# Patient Record
Sex: FEMALE | URBAN - METROPOLITAN AREA
[De-identification: names, ages, dates, MRNs, and addresses within clinical notes are randomized per-mention and may not be internally consistent; named-entity substitution may affect disease eponyms.]

---

## 2017-06-12 ENCOUNTER — EMERGENCY (EMERGENCY)
Facility: HOSPITAL | Age: 22
LOS: 1 days | Discharge: HOME | End: 2017-06-12
Attending: EMERGENCY MEDICINE | Admitting: EMERGENCY MEDICINE
Payer: SELF-PAY

## 2017-06-12 VITALS
HEART RATE: 78 BPM | RESPIRATION RATE: 17 BRPM | SYSTOLIC BLOOD PRESSURE: 122 MMHG | OXYGEN SATURATION: 100 % | DIASTOLIC BLOOD PRESSURE: 74 MMHG | TEMPERATURE: 99 F

## 2017-06-12 PROCEDURE — 99285 EMERGENCY DEPT VISIT HI MDM: CPT | Mod: 25

## 2017-06-12 PROCEDURE — 99053 MED SERV 10PM-8AM 24 HR FAC: CPT

## 2017-06-13 VITALS
DIASTOLIC BLOOD PRESSURE: 71 MMHG | SYSTOLIC BLOOD PRESSURE: 104 MMHG | HEART RATE: 81 BPM | OXYGEN SATURATION: 100 % | RESPIRATION RATE: 18 BRPM

## 2017-06-13 DIAGNOSIS — N12 TUBULO-INTERSTITIAL NEPHRITIS, NOT SPECIFIED AS ACUTE OR CHRONIC: ICD-10-CM

## 2017-06-13 LAB
ALBUMIN SERPL ELPH-MCNC: 4.3 G/DL — SIGNIFICANT CHANGE UP (ref 3.3–5)
ALP SERPL-CCNC: 89 U/L — SIGNIFICANT CHANGE UP (ref 40–120)
ALT FLD-CCNC: 11 U/L — SIGNIFICANT CHANGE UP (ref 4–33)
APPEARANCE UR: CLEAR — SIGNIFICANT CHANGE UP
AST SERPL-CCNC: 18 U/L — SIGNIFICANT CHANGE UP (ref 4–32)
BASOPHILS # BLD AUTO: 0.02 K/UL — SIGNIFICANT CHANGE UP (ref 0–0.2)
BASOPHILS NFR BLD AUTO: 0.2 % — SIGNIFICANT CHANGE UP (ref 0–2)
BILIRUB SERPL-MCNC: 0.4 MG/DL — SIGNIFICANT CHANGE UP (ref 0.2–1.2)
BILIRUB UR-MCNC: NEGATIVE — SIGNIFICANT CHANGE UP
BLOOD UR QL VISUAL: HIGH
BUN SERPL-MCNC: 7 MG/DL — SIGNIFICANT CHANGE UP (ref 7–23)
CALCIUM SERPL-MCNC: 9.4 MG/DL — SIGNIFICANT CHANGE UP (ref 8.4–10.5)
CHLORIDE SERPL-SCNC: 105 MMOL/L — SIGNIFICANT CHANGE UP (ref 98–107)
CO2 SERPL-SCNC: 24 MMOL/L — SIGNIFICANT CHANGE UP (ref 22–31)
COD CRY URNS QL: SIGNIFICANT CHANGE UP (ref 0–0)
COLOR SPEC: YELLOW — SIGNIFICANT CHANGE UP
CREAT SERPL-MCNC: 0.62 MG/DL — SIGNIFICANT CHANGE UP (ref 0.5–1.3)
EOSINOPHIL # BLD AUTO: 0.14 K/UL — SIGNIFICANT CHANGE UP (ref 0–0.5)
EOSINOPHIL NFR BLD AUTO: 1.6 % — SIGNIFICANT CHANGE UP (ref 0–6)
GLUCOSE SERPL-MCNC: 87 MG/DL — SIGNIFICANT CHANGE UP (ref 70–99)
GLUCOSE UR-MCNC: NEGATIVE — SIGNIFICANT CHANGE UP
HCT VFR BLD CALC: 36.7 % — SIGNIFICANT CHANGE UP (ref 34.5–45)
HGB BLD-MCNC: 11 G/DL — LOW (ref 11.5–15.5)
IMM GRANULOCYTES NFR BLD AUTO: 0.2 % — SIGNIFICANT CHANGE UP (ref 0–1.5)
KETONES UR-MCNC: SIGNIFICANT CHANGE UP
LEUKOCYTE ESTERASE UR-ACNC: HIGH
LIDOCAIN IGE QN: 21.2 U/L — SIGNIFICANT CHANGE UP (ref 7–60)
LYMPHOCYTES # BLD AUTO: 2.93 K/UL — SIGNIFICANT CHANGE UP (ref 1–3.3)
LYMPHOCYTES # BLD AUTO: 34 % — SIGNIFICANT CHANGE UP (ref 13–44)
MCHC RBC-ENTMCNC: 22.2 PG — LOW (ref 27–34)
MCHC RBC-ENTMCNC: 30 % — LOW (ref 32–36)
MCV RBC AUTO: 74.1 FL — LOW (ref 80–100)
MONOCYTES # BLD AUTO: 0.61 K/UL — SIGNIFICANT CHANGE UP (ref 0–0.9)
MONOCYTES NFR BLD AUTO: 7.1 % — SIGNIFICANT CHANGE UP (ref 2–14)
MUCOUS THREADS # UR AUTO: SIGNIFICANT CHANGE UP
NEUTROPHILS # BLD AUTO: 4.9 K/UL — SIGNIFICANT CHANGE UP (ref 1.8–7.4)
NEUTROPHILS NFR BLD AUTO: 56.9 % — SIGNIFICANT CHANGE UP (ref 43–77)
NITRITE UR-MCNC: NEGATIVE — SIGNIFICANT CHANGE UP
PH UR: 6.5 — SIGNIFICANT CHANGE UP (ref 4.6–8)
PLATELET # BLD AUTO: 269 K/UL — SIGNIFICANT CHANGE UP (ref 150–400)
PMV BLD: 11.3 FL — SIGNIFICANT CHANGE UP (ref 7–13)
POTASSIUM SERPL-MCNC: 4.2 MMOL/L — SIGNIFICANT CHANGE UP (ref 3.5–5.3)
POTASSIUM SERPL-SCNC: 4.2 MMOL/L — SIGNIFICANT CHANGE UP (ref 3.5–5.3)
PROT SERPL-MCNC: 7.6 G/DL — SIGNIFICANT CHANGE UP (ref 6–8.3)
PROT UR-MCNC: 20 — SIGNIFICANT CHANGE UP
RBC # BLD: 4.95 M/UL — SIGNIFICANT CHANGE UP (ref 3.8–5.2)
RBC # FLD: 14.8 % — HIGH (ref 10.3–14.5)
RBC CASTS # UR COMP ASSIST: SIGNIFICANT CHANGE UP (ref 0–?)
SODIUM SERPL-SCNC: 144 MMOL/L — SIGNIFICANT CHANGE UP (ref 135–145)
SP GR SPEC: 1.01 — SIGNIFICANT CHANGE UP (ref 1–1.03)
SQUAMOUS # UR AUTO: SIGNIFICANT CHANGE UP
UROBILINOGEN FLD QL: NORMAL E.U. — SIGNIFICANT CHANGE UP (ref 0.1–0.2)
WBC # BLD: 8.62 K/UL — SIGNIFICANT CHANGE UP (ref 3.8–10.5)
WBC # FLD AUTO: 8.62 K/UL — SIGNIFICANT CHANGE UP (ref 3.8–10.5)
WBC CLUMPS #/AREA URNS HPF: PRESENT — HIGH (ref 0–?)
WBC UR QL: SIGNIFICANT CHANGE UP (ref 0–?)

## 2017-06-13 PROCEDURE — 74177 CT ABD & PELVIS W/CONTRAST: CPT | Mod: 26

## 2017-06-13 RX ORDER — METOCLOPRAMIDE HCL 10 MG
10 TABLET ORAL ONCE
Qty: 0 | Refills: 0 | Status: COMPLETED | OUTPATIENT
Start: 2017-06-13 | End: 2017-06-13

## 2017-06-13 RX ORDER — CEFTRIAXONE 500 MG/1
1 INJECTION, POWDER, FOR SOLUTION INTRAMUSCULAR; INTRAVENOUS ONCE
Qty: 0 | Refills: 0 | Status: COMPLETED | OUTPATIENT
Start: 2017-06-13 | End: 2017-06-13

## 2017-06-13 RX ORDER — CIPROFLOXACIN LACTATE 400MG/40ML
1 VIAL (ML) INTRAVENOUS
Qty: 28 | Refills: 0
Start: 2017-06-13 | End: 2017-06-27

## 2017-06-13 RX ORDER — SODIUM CHLORIDE 9 MG/ML
1000 INJECTION INTRAMUSCULAR; INTRAVENOUS; SUBCUTANEOUS ONCE
Qty: 0 | Refills: 0 | Status: COMPLETED | OUTPATIENT
Start: 2017-06-13 | End: 2017-06-13

## 2017-06-13 RX ORDER — ACETAMINOPHEN 500 MG
1000 TABLET ORAL ONCE
Qty: 0 | Refills: 0 | Status: DISCONTINUED | OUTPATIENT
Start: 2017-06-13 | End: 2017-06-13

## 2017-06-13 RX ADMIN — SODIUM CHLORIDE 1000 MILLILITER(S): 9 INJECTION INTRAMUSCULAR; INTRAVENOUS; SUBCUTANEOUS at 02:07

## 2017-06-13 RX ADMIN — CEFTRIAXONE 100 GRAM(S): 500 INJECTION, POWDER, FOR SOLUTION INTRAMUSCULAR; INTRAVENOUS at 05:26

## 2017-06-13 RX ADMIN — Medication 10 MILLIGRAM(S): at 02:07

## 2017-06-13 NOTE — CONSULT NOTE ADULT - ASSESSMENT
21 y.o female with right lower quadrant pain, nausea and vomiting. Her pain was improved in the ED. Ct did not show  Infectious etiology vs passed stone.

## 2017-06-13 NOTE — ED PROVIDER NOTE - CARE PLAN
Principal Discharge DX:	Pyelonephritis  Instructions for follow-up, activity and diet:	Follow up with your PMD within 48-72 hours or Follow up in the clinic ([phone]).  Take Cipro 500 mg 1 tab 2x/day for 14 days.  Increase fluids. Motrin 600mg every 8 hours with food for pain. Worsening pain, new fever, chills, nausea, vomiting return to ER. Principal Discharge DX:	Pyelonephritis  Instructions for follow-up, activity and diet:	Follow up with your PMD within 48-72 hours or Follow up in the clinic ([phone]). Follow up with urology in 3 days for a repeat ultrasound.  Take Cipro 500 mg 1 tab 2x/day for 14 days.  Increase fluids. Motrin 600mg every 8 hours with food for pain. Worsening pain, new fever, chills, nausea, vomiting return to ER. Principal Discharge DX:	Pyelonephritis  Instructions for follow-up, activity and diet:	Follow up with your PMD within 48-72 hours or Follow up in the clinic ([phone]). Follow up with urology in 3 days for a repeat ultrasound.  Take Cipro 500 mg 1 tab 2x/day for 14 days.  Increase fluids. Motrin 600mg every 8 hours with food for pain. Worsening pain, new fever, chills, nausea, vomiting return to ER. Principal Discharge DX:	Pyelonephritis  Instructions for follow-up, activity and diet:	Follow up with your PMD within 48-72 hours or Follow up in the clinic ([phone]). Follow up with urology in 3 days for a repeat ultrasound.  Take Cipro 500 mg 1 tab 2x/day for 14 days.  Increase fluids. Motrin 600mg every 8 hours with food for pain. Worsening pain, new fever, chills, nausea, vomiting return to ER.

## 2017-06-13 NOTE — ED PROVIDER NOTE - PLAN OF CARE
Follow up with your PMD within 48-72 hours or Follow up in the clinic ([phone]).  Take Cipro 500 mg 1 tab 2x/day for 14 days.  Increase fluids. Motrin 600mg every 8 hours with food for pain. Worsening pain, new fever, chills, nausea, vomiting return to ER. Follow up with your PMD within 48-72 hours or Follow up in the clinic ([phone]). Follow up with urology in 3 days for a repeat ultrasound.  Take Cipro 500 mg 1 tab 2x/day for 14 days.  Increase fluids. Motrin 600mg every 8 hours with food for pain. Worsening pain, new fever, chills, nausea, vomiting return to ER.

## 2017-06-13 NOTE — ED PROVIDER NOTE - OBJECTIVE STATEMENT
20 yo female no significant PMHx presents to the ED c/o right sided abdominal pain and 6-7 episodes of vomiting x few hours. Pt states that around 6 pm she began having the pain and vomiting. Denies cp ,sob, fever, chills, urinary sx , vaginal bleeding, vaginal discharge, diarrhea, constipation, recent travel, change in food, recent illness, social history. Pt is sexually active with one partner. LMP finished yesterday.

## 2017-06-13 NOTE — ED PROVIDER NOTE - PROGRESS NOTE DETAILS
NIDIA Alcantara: urology states pt needs po abx and repeat ultrasound, and NIDIA Alcantara: urology states pt needs po abx and repeat ultrasound, Pt states she is feeling better. NIDIA Alcantara: urology states pt needs po abx and repeat ultrasound, Pt states she is feeling better. PO challenge passed. NIDIA Alcantara: Considered pt for admission for IV abx and IVF, spoke with the hospitalist on call and urology. Urology states pt needs po abx and repeat ultrasound. With patient Vitals signs stable, and given her age and no pmhx, pt can be treated out patient at this time. Hospitalist and Urology do not think pt needs admission to hospital at this time. Pt states she is feeling better. PO challenge passed. VSS. Pt ok for discharge.

## 2017-06-13 NOTE — ED PROVIDER NOTE - ATTENDING CONTRIBUTION TO CARE
21 year old female w right sided abdominal pain for the last several days.  + nausea and moderate pain to RLQ and RUQ .  Pt is sexually active.  PE + marked tenderness in RLQ + guarding no rebound,  CV s1s2  Lungs cta b/l Neuro nonfocal exam  Impression Rule out appendicitis.

## 2017-06-13 NOTE — ED ADULT NURSE NOTE - OBJECTIVE STATEMENT
patient arrives to intake c/o rlq pain accompanied with n/v and a headache, denies any chest pain or shortness of breath, denies pertinent past medical history. nad noted. respirations are even and unlabored. spo2 100% on room air. 20g iv access obtained, labs drawn and sent, denies any diarrhea or fevers at home. denies any urinary symptoms. appears comfortable, medicated per orders. primary intake rn aware of patient status.

## 2017-06-13 NOTE — CONSULT NOTE ADULT - SUBJECTIVE AND OBJECTIVE BOX
HPI: This is a 21 y.o female who came to the ER complaining of right lower quadrant pain associated with nausea and vomiting. She denies any fever, flank pain, dysuria and hematuria. Her pain has improved in ED without medications.    PAST MEDICAL & SURGICAL HISTORY:  No pertinent past medical history  No significant past surgical history      MEDICATIONS: none        FAMILY HISTORY:  No pertinent family history in first degree relatives      Allergies    No Known Allergies            SOCIAL HISTORY: No smoking    REVIEW OF SYSTEMS: Otherwise negative as stated in HPI    Physical Exam  Vital signs  T(F): 98, Max: 99 ( @ 23:31)  HR: 81  BP: 104/71  SpO2: 100%    Gen:  [x] NAD [] toxic    Pulm:  [x] no resp distress [] no substernal retractions  	  CV:  [x] no JVD  [x] RRR    GI:  [x] Soft [x] ND + right lower quadrant tenderness, no CVA tenderness bilaterally, no guarding, no rebound.                          	  MSK:  Edema []Y [x]N    LABS:       @ 00:33    WBC 8.62  / Hct 36.7  / SCr 0.62       Urinalysis Basic - ( 2017 01:15 )    Color: YELLOW / Appearance: CLEAR / S.014 / pH: 6.5  Gluc: NEGATIVE / Ketone: TRACE  / Bili: NEGATIVE / Urobili: NORMAL E.U.   Blood: TRACE / Protein: 20 / Nitrite: NEGATIVE   Leuk Esterase: LARGE / RBC: 0-2 / WBC 25-50   Sq Epi: OCC / Non Sq Epi: x / Bacteria: x        Urine Cx: p      RADIOLOGY:     IMPRESSION: Mild bilateral hydronephrosis with abnormal bilateral   urothelial enhancement, suspicious for pyelitis. No perinephric fluid   collection. Correlate with clinical examination and urinalysis.

## 2017-06-13 NOTE — ED PROVIDER NOTE - MEDICAL DECISION MAKING DETAILS
20 yo female with vomiting and RLQ pain   R/o appy. DDx ovarian pathology.   Plan: labs, ct, ivf, reglan, reassess.

## 2017-06-13 NOTE — ED PROVIDER NOTE - CHPI ED SYMPTOMS NEG
no diarrhea/no abdominal distension/no hematuria/no dysuria/no fever/no burning urination/no chills/no blood in stool

## 2017-06-14 PROBLEM — Z00.00 ENCOUNTER FOR PREVENTIVE HEALTH EXAMINATION: Status: ACTIVE | Noted: 2017-06-14

## 2017-06-14 LAB — SPECIMEN SOURCE: SIGNIFICANT CHANGE UP

## 2017-06-15 ENCOUNTER — APPOINTMENT (OUTPATIENT)
Dept: ULTRASOUND IMAGING | Facility: CLINIC | Age: 22
End: 2017-06-15

## 2017-06-15 LAB — BACTERIA UR CULT: SIGNIFICANT CHANGE UP

## 2017-06-26 NOTE — ED ADULT NURSE NOTE - TEMPLATE LIST FOR HEAD TO TOE ASSESSMENT
Pt daughter called in regarding the approval for her dads PT. Contacted our auth dept to check status of referral and will call them back.      General

## 2019-12-30 ENCOUNTER — OUTPATIENT (OUTPATIENT)
Dept: EMERGENCY DEPT | Facility: HOSPITAL | Age: 24
LOS: 1 days | Discharge: HOME | End: 2019-12-30

## 2019-12-30 VITALS
OXYGEN SATURATION: 100 % | DIASTOLIC BLOOD PRESSURE: 70 MMHG | RESPIRATION RATE: 16 BRPM | TEMPERATURE: 99 F | HEART RATE: 137 BPM | SYSTOLIC BLOOD PRESSURE: 108 MMHG

## 2019-12-30 LAB
ALBUMIN SERPL ELPH-MCNC: 4 G/DL — SIGNIFICANT CHANGE UP (ref 3.3–5)
ALP SERPL-CCNC: 75 U/L — SIGNIFICANT CHANGE UP (ref 40–120)
ALT FLD-CCNC: 8 U/L — SIGNIFICANT CHANGE UP (ref 4–33)
ANION GAP SERPL CALC-SCNC: 13 MMO/L — SIGNIFICANT CHANGE UP (ref 7–14)
APPEARANCE UR: SIGNIFICANT CHANGE UP
AST SERPL-CCNC: 14 U/L — SIGNIFICANT CHANGE UP (ref 4–32)
BACTERIA # UR AUTO: SIGNIFICANT CHANGE UP
BASE EXCESS BLDV CALC-SCNC: -1.4 MMOL/L — SIGNIFICANT CHANGE UP
BASOPHILS # BLD AUTO: 0.03 K/UL — SIGNIFICANT CHANGE UP (ref 0–0.2)
BASOPHILS NFR BLD AUTO: 0.3 % — SIGNIFICANT CHANGE UP (ref 0–2)
BILIRUB SERPL-MCNC: 0.5 MG/DL — SIGNIFICANT CHANGE UP (ref 0.2–1.2)
BILIRUB UR-MCNC: NEGATIVE — SIGNIFICANT CHANGE UP
BLOOD GAS VENOUS - CREATININE: 0.59 MG/DL — SIGNIFICANT CHANGE UP (ref 0.5–1.3)
BLOOD GAS VENOUS - FIO2: 21 — SIGNIFICANT CHANGE UP
BLOOD UR QL VISUAL: SIGNIFICANT CHANGE UP
BUN SERPL-MCNC: 6 MG/DL — LOW (ref 7–23)
CALCIUM SERPL-MCNC: 9.1 MG/DL — SIGNIFICANT CHANGE UP (ref 8.4–10.5)
CHLORIDE BLDV-SCNC: 106 MMOL/L — SIGNIFICANT CHANGE UP (ref 96–108)
CHLORIDE SERPL-SCNC: 102 MMOL/L — SIGNIFICANT CHANGE UP (ref 98–107)
CO2 SERPL-SCNC: 19 MMOL/L — LOW (ref 22–31)
COLOR SPEC: YELLOW — SIGNIFICANT CHANGE UP
CREAT SERPL-MCNC: 0.54 MG/DL — SIGNIFICANT CHANGE UP (ref 0.5–1.3)
EOSINOPHIL # BLD AUTO: 0.06 K/UL — SIGNIFICANT CHANGE UP (ref 0–0.5)
EOSINOPHIL NFR BLD AUTO: 0.6 % — SIGNIFICANT CHANGE UP (ref 0–6)
GAS PNL BLDV: 134 MMOL/L — LOW (ref 136–146)
GLUCOSE BLDV-MCNC: 113 MG/DL — HIGH (ref 70–99)
GLUCOSE SERPL-MCNC: 113 MG/DL — HIGH (ref 70–99)
GLUCOSE UR-MCNC: NEGATIVE — SIGNIFICANT CHANGE UP
HCG SERPL-ACNC: < 5 MIU/ML — SIGNIFICANT CHANGE UP
HCO3 BLDV-SCNC: 23 MMOL/L — SIGNIFICANT CHANGE UP (ref 20–27)
HCT VFR BLD CALC: 38.5 % — SIGNIFICANT CHANGE UP (ref 34.5–45)
HCT VFR BLDV CALC: 36.5 % — SIGNIFICANT CHANGE UP (ref 34.5–45)
HGB BLD-MCNC: 11.3 G/DL — LOW (ref 11.5–15.5)
HGB BLDV-MCNC: 11.9 G/DL — SIGNIFICANT CHANGE UP (ref 11.5–15.5)
IMM GRANULOCYTES NFR BLD AUTO: 0.2 % — SIGNIFICANT CHANGE UP (ref 0–1.5)
KETONES UR-MCNC: NEGATIVE — SIGNIFICANT CHANGE UP
LACTATE BLDV-MCNC: 1.3 MMOL/L — SIGNIFICANT CHANGE UP (ref 0.5–2)
LEUKOCYTE ESTERASE UR-ACNC: HIGH
LYMPHOCYTES # BLD AUTO: 1.86 K/UL — SIGNIFICANT CHANGE UP (ref 1–3.3)
LYMPHOCYTES # BLD AUTO: 17.7 % — SIGNIFICANT CHANGE UP (ref 13–44)
MCHC RBC-ENTMCNC: 20.9 PG — LOW (ref 27–34)
MCHC RBC-ENTMCNC: 29.4 % — LOW (ref 32–36)
MCV RBC AUTO: 71.2 FL — LOW (ref 80–100)
MONOCYTES # BLD AUTO: 0.9 K/UL — SIGNIFICANT CHANGE UP (ref 0–0.9)
MONOCYTES NFR BLD AUTO: 8.6 % — SIGNIFICANT CHANGE UP (ref 2–14)
NEUTROPHILS # BLD AUTO: 7.63 K/UL — HIGH (ref 1.8–7.4)
NEUTROPHILS NFR BLD AUTO: 72.6 % — SIGNIFICANT CHANGE UP (ref 43–77)
NITRITE UR-MCNC: POSITIVE — SIGNIFICANT CHANGE UP
NRBC # FLD: 0 K/UL — SIGNIFICANT CHANGE UP (ref 0–0)
PCO2 BLDV: 44 MMHG — SIGNIFICANT CHANGE UP (ref 41–51)
PH BLDV: 7.35 PH — SIGNIFICANT CHANGE UP (ref 7.32–7.43)
PH UR: 6.5 — SIGNIFICANT CHANGE UP (ref 5–8)
PLATELET # BLD AUTO: 257 K/UL — SIGNIFICANT CHANGE UP (ref 150–400)
PMV BLD: 11.4 FL — SIGNIFICANT CHANGE UP (ref 7–13)
PO2 BLDV: 41 MMHG — HIGH (ref 35–40)
POTASSIUM BLDV-SCNC: 3 MMOL/L — LOW (ref 3.4–4.5)
POTASSIUM SERPL-MCNC: 3.2 MMOL/L — LOW (ref 3.5–5.3)
POTASSIUM SERPL-SCNC: 3.2 MMOL/L — LOW (ref 3.5–5.3)
PROT SERPL-MCNC: 7.6 G/DL — SIGNIFICANT CHANGE UP (ref 6–8.3)
PROT UR-MCNC: 70 — SIGNIFICANT CHANGE UP
RBC # BLD: 5.41 M/UL — HIGH (ref 3.8–5.2)
RBC # FLD: 15.5 % — HIGH (ref 10.3–14.5)
RBC CASTS # UR COMP ASSIST: SIGNIFICANT CHANGE UP (ref 0–?)
SAO2 % BLDV: 68.8 % — SIGNIFICANT CHANGE UP (ref 60–85)
SODIUM SERPL-SCNC: 134 MMOL/L — LOW (ref 135–145)
SP GR SPEC: 1.01 — SIGNIFICANT CHANGE UP (ref 1–1.04)
UROBILINOGEN FLD QL: NORMAL — SIGNIFICANT CHANGE UP
WBC # BLD: 10.5 K/UL — SIGNIFICANT CHANGE UP (ref 3.8–10.5)
WBC # FLD AUTO: 10.5 K/UL — SIGNIFICANT CHANGE UP (ref 3.8–10.5)
WBC UR QL: >50 — HIGH (ref 0–?)

## 2019-12-30 RX ORDER — ACETAMINOPHEN 500 MG
650 TABLET ORAL ONCE
Refills: 0 | Status: COMPLETED | OUTPATIENT
Start: 2019-12-30 | End: 2019-12-30

## 2019-12-30 RX ORDER — MORPHINE SULFATE 50 MG/1
2 CAPSULE, EXTENDED RELEASE ORAL ONCE
Refills: 0 | Status: DISCONTINUED | OUTPATIENT
Start: 2019-12-30 | End: 2019-12-30

## 2019-12-30 RX ORDER — SODIUM CHLORIDE 9 MG/ML
2000 INJECTION INTRAMUSCULAR; INTRAVENOUS; SUBCUTANEOUS ONCE
Refills: 0 | Status: COMPLETED | OUTPATIENT
Start: 2019-12-30 | End: 2019-12-30

## 2019-12-30 RX ADMIN — SODIUM CHLORIDE 2000 MILLILITER(S): 9 INJECTION INTRAMUSCULAR; INTRAVENOUS; SUBCUTANEOUS at 22:53

## 2019-12-30 RX ADMIN — Medication 650 MILLIGRAM(S): at 22:53

## 2019-12-30 RX ADMIN — MORPHINE SULFATE 2 MILLIGRAM(S): 50 CAPSULE, EXTENDED RELEASE ORAL at 22:53

## 2019-12-30 NOTE — ED PROVIDER NOTE - PHYSICAL EXAMINATION
Vital Signs Last 24 Hrs  T(C): 37.9 (30 Dec 2019 22:44), Max: 37.9 (30 Dec 2019 22:44)  T(F): 100.2 (30 Dec 2019 22:44), Max: 100.2 (30 Dec 2019 22:44)  HR: 105 (30 Dec 2019 22:44) (105 - 137)  BP: 121/80 (30 Dec 2019 22:44) (108/70 - 121/80)  BP(mean): --  RR: 15 (30 Dec 2019 22:44) (15 - 16)  SpO2: 100% (30 Dec 2019 22:44) (100% - 100%)    General: NAD  Head: Normocephalic, Atraumatic  Eyes: PERRLA, EOMI, normal sclera  Throat: Moist mucous membranes  Respiratory: CTAB, normal respiratory effort, no wheezes, crackles, rales  CV: RRR, S1/S2, no murmurs, rubs or gallops  Abdominal: Soft, bowel sounds present, tender to palpation in RLQ, CVA tenderness present, ND  Extremities: No edema, 2+ DP pulses  Neurological: A&Ox4, MAEx4, non-focal  Skin: No rashes

## 2019-12-30 NOTE — ED PROVIDER NOTE - OBJECTIVE STATEMENT
24 year old female with history of kidney stones and urinary 24 year old female presents with complaint of abdominal pain. Last night, the patient began to experience RLQ pain with associated right flank pain. There is associated nausea. The patient has vomited 6-7 times. Emesis NB/NB. Has had a similar episode two years ago. She has experienced increased urinary frequency and cloudy urine. She has had fever, chills, headache and dizziness. She denies chest pain, shortness of breath, constipation or diarrhea.

## 2019-12-30 NOTE — ED PROVIDER NOTE - CLINICAL SUMMARY MEDICAL DECISION MAKING FREE TEXT BOX
24 year old female with RLQ pain with CVA tenderness. CBC, CMP, UA, UCX, CT abdomen and pelvis, hCG ordered, and NS bolus ordered. Will perform pelvic exam.

## 2019-12-30 NOTE — ED PROVIDER NOTE - ATTENDING CONTRIBUTION TO CARE
Attending Statement: I have personally seen and examined this patient. I have fully participated in the care of this patient. I have reviewed all pertinent clinical information, including history physical exam, plan and the Resident's note and agree except as noted  25yo F no sig pmhx pw RLQ pain since last night. Pain located on the RLQ radiate to the back, constant pain, worse with urination. +dysuria No hematuria +subjective temp, chills. +nausea +nb vomit. LMP 12-4-19 denies preg. no hx vag discharge   Vital signs noted. +tachcyardic +febrile MMM. non icteric. soft tender RLQ, right flank. no guarding neg rose sign no pedal edema. no calf tenderness. normal pulses bilateral feet.  plan labs, urine, ct abdomen, IVF, pain med

## 2019-12-30 NOTE — ED ADULT TRIAGE NOTE - CHIEF COMPLAINT QUOTE
Pt with a pmhx of kidney stones and uti presents with c/o right flank, right abd pain and nausea since last night. Pt took Motrin prior to ED arrival.

## 2019-12-30 NOTE — ED ADULT NURSE NOTE - OBJECTIVE STATEMENT
pt a&o x3 c/o right flank pain radiating towards right lower quadrant since last night. endorses nausea, vomiting and subjective fevers associated with chills. hx uti and kidney stones. nad noted. labs drawn and sent, left ac 20g placed

## 2019-12-30 NOTE — ED ADULT NURSE NOTE - ED STAT RN HANDOFF DETAILS
endorsed to rn orlene. pt a&o x3, no c./o pain. iv line patent, no s/s of infiltration. nad noted. safety maintained

## 2019-12-31 ENCOUNTER — TRANSCRIPTION ENCOUNTER (OUTPATIENT)
Age: 24
End: 2019-12-31

## 2019-12-31 DIAGNOSIS — R10.9 UNSPECIFIED ABDOMINAL PAIN: ICD-10-CM

## 2019-12-31 DIAGNOSIS — N23 UNSPECIFIED RENAL COLIC: ICD-10-CM

## 2019-12-31 LAB — SPECIMEN SOURCE: SIGNIFICANT CHANGE UP

## 2019-12-31 RX ORDER — CEFTRIAXONE 500 MG/1
1000 INJECTION, POWDER, FOR SOLUTION INTRAMUSCULAR; INTRAVENOUS ONCE
Refills: 0 | Status: COMPLETED | OUTPATIENT
Start: 2019-12-31 | End: 2019-12-31

## 2019-12-31 RX ORDER — OXYCODONE HYDROCHLORIDE 5 MG/1
5 TABLET ORAL EVERY 4 HOURS
Refills: 0 | Status: DISCONTINUED | OUTPATIENT
Start: 2019-12-31 | End: 2020-01-01

## 2019-12-31 RX ORDER — INFLUENZA VIRUS VACCINE 15; 15; 15; 15 UG/.5ML; UG/.5ML; UG/.5ML; UG/.5ML
0.5 SUSPENSION INTRAMUSCULAR ONCE
Refills: 0 | Status: COMPLETED | OUTPATIENT
Start: 2019-12-31 | End: 2019-12-31

## 2019-12-31 RX ORDER — TAMSULOSIN HYDROCHLORIDE 0.4 MG/1
0.4 CAPSULE ORAL DAILY
Refills: 0 | Status: DISCONTINUED | OUTPATIENT
Start: 2019-12-31 | End: 2020-01-01

## 2019-12-31 RX ORDER — OXYCODONE HYDROCHLORIDE 5 MG/1
10 TABLET ORAL EVERY 6 HOURS
Refills: 0 | Status: DISCONTINUED | OUTPATIENT
Start: 2019-12-31 | End: 2020-01-01

## 2019-12-31 RX ORDER — SODIUM CHLORIDE 9 MG/ML
1000 INJECTION INTRAMUSCULAR; INTRAVENOUS; SUBCUTANEOUS
Refills: 0 | Status: DISCONTINUED | OUTPATIENT
Start: 2019-12-31 | End: 2020-01-01

## 2019-12-31 RX ORDER — POTASSIUM CHLORIDE 20 MEQ
40 PACKET (EA) ORAL ONCE
Refills: 0 | Status: COMPLETED | OUTPATIENT
Start: 2019-12-31 | End: 2019-12-31

## 2019-12-31 RX ORDER — CEFTRIAXONE 500 MG/1
1000 INJECTION, POWDER, FOR SOLUTION INTRAMUSCULAR; INTRAVENOUS EVERY 24 HOURS
Refills: 0 | Status: DISCONTINUED | OUTPATIENT
Start: 2020-01-01 | End: 2020-01-01

## 2019-12-31 RX ADMIN — TAMSULOSIN HYDROCHLORIDE 0.4 MILLIGRAM(S): 0.4 CAPSULE ORAL at 12:02

## 2019-12-31 RX ADMIN — SODIUM CHLORIDE 125 MILLILITER(S): 9 INJECTION INTRAMUSCULAR; INTRAVENOUS; SUBCUTANEOUS at 06:55

## 2019-12-31 RX ADMIN — Medication 40 MILLIEQUIVALENT(S): at 01:32

## 2019-12-31 RX ADMIN — CEFTRIAXONE 100 MILLIGRAM(S): 500 INJECTION, POWDER, FOR SOLUTION INTRAMUSCULAR; INTRAVENOUS at 00:38

## 2019-12-31 NOTE — H&P ADULT - HISTORY OF PRESENT ILLNESS
24 y.o female with PMHx of kidney stones about 5 years ago presented to the ER with 1 day history of right flank pain radiating to right lower quadrant associated with subjective fever, multiple episodes of vomiting. She denies any dysuria, hematuria.

## 2019-12-31 NOTE — H&P ADULT - NEGATIVE RESPIRATORY AND THORAX SYMPTOMS
Called [facility] and there was no answer. I was unable to leave a message since her mail box was full. If patient calls back please let her know that her [employer] disability form is complete and has been faxed.
no cough/no dyspnea

## 2019-12-31 NOTE — H&P ADULT - ATTENDING COMMENTS
Patient seen and examined.  Pain controlled.  No fever/chills.  Urine culture pending.  Continue on ceftriaxone.  Plan for OR for cystoscopy, stent placement, possible stone extraction.

## 2019-12-31 NOTE — ED ADULT NURSE REASSESSMENT NOTE - NS ED NURSE REASSESS COMMENT FT1
Break coverage RN: Pt resting on stretcher, calm and cooperative. Respirations even/unlabored, nad noted. will continue to monitor

## 2020-01-01 ENCOUNTER — TRANSCRIPTION ENCOUNTER (OUTPATIENT)
Age: 25
End: 2020-01-01

## 2020-01-01 VITALS
TEMPERATURE: 98 F | OXYGEN SATURATION: 100 % | HEART RATE: 103 BPM | DIASTOLIC BLOOD PRESSURE: 74 MMHG | SYSTOLIC BLOOD PRESSURE: 123 MMHG | RESPIRATION RATE: 16 BRPM

## 2020-01-01 LAB
BACTERIA UR CULT: SIGNIFICANT CHANGE UP
HCG UR-SCNC: NEGATIVE — SIGNIFICANT CHANGE UP
SP GR UR: 1.01 — SIGNIFICANT CHANGE UP (ref 1–1.04)
SPECIMEN SOURCE: SIGNIFICANT CHANGE UP
SPECIMEN SOURCE: SIGNIFICANT CHANGE UP

## 2020-01-01 RX ORDER — CEFUROXIME AXETIL 250 MG
1 TABLET ORAL
Qty: 14 | Refills: 0
Start: 2020-01-01 | End: 2020-01-07

## 2020-01-01 RX ORDER — ONDANSETRON 8 MG/1
4 TABLET, FILM COATED ORAL ONCE
Refills: 0 | Status: DISCONTINUED | OUTPATIENT
Start: 2020-01-01 | End: 2020-01-01

## 2020-01-01 RX ORDER — FENTANYL CITRATE 50 UG/ML
50 INJECTION INTRAVENOUS
Refills: 0 | Status: DISCONTINUED | OUTPATIENT
Start: 2020-01-01 | End: 2020-01-01

## 2020-01-01 RX ORDER — FENTANYL CITRATE 50 UG/ML
25 INJECTION INTRAVENOUS
Refills: 0 | Status: DISCONTINUED | OUTPATIENT
Start: 2020-01-01 | End: 2020-01-01

## 2020-01-01 RX ADMIN — TAMSULOSIN HYDROCHLORIDE 0.4 MILLIGRAM(S): 0.4 CAPSULE ORAL at 13:00

## 2020-01-01 RX ADMIN — SODIUM CHLORIDE 125 MILLILITER(S): 9 INJECTION INTRAMUSCULAR; INTRAVENOUS; SUBCUTANEOUS at 00:41

## 2020-01-01 RX ADMIN — CEFTRIAXONE 100 MILLIGRAM(S): 500 INJECTION, POWDER, FOR SOLUTION INTRAMUSCULAR; INTRAVENOUS at 00:40

## 2020-01-01 RX ADMIN — SODIUM CHLORIDE 125 MILLILITER(S): 9 INJECTION INTRAMUSCULAR; INTRAVENOUS; SUBCUTANEOUS at 10:59

## 2020-01-01 NOTE — BRIEF OPERATIVE NOTE - NSICDXBRIEFPROCEDURE_GEN_ALL_CORE_FT
PROCEDURES:  Cystoureteroscopy, with lithotripsy, calculus removal, and ureteral stent insertion 01-Jan-2020 10:45:28  Lia Rider

## 2020-01-01 NOTE — PRE-OP CHECKLIST - NOTHING BY MOUTH SINCE
Called Natty Leon from [employer] in regard to a fax received about generic metformin vs. Brand metformin. Wanted to discuss this with him. Left message and call back number. 31-Dec-2019 23:59

## 2020-01-01 NOTE — DISCHARGE NOTE PROVIDER - CARE PROVIDER_API CALL
Bob Bosch)  Urology  [address] [place] [address], [place] [address]  Phone: [phone]  Fax: [phone]  Follow Up Time:

## 2020-01-01 NOTE — BRIEF OPERATIVE NOTE - OPERATION/FINDINGS
cysto R URS, laser litho, R stent  Dictation ID# cysto R URS, laser litho, R stent  Dictation ID# [id number]

## 2020-01-01 NOTE — PROGRESS NOTE ADULT - SUBJECTIVE AND OBJECTIVE BOX
Subjective - Pain controlled. Consented for OR     Objective    Vital signs  T(F): , Max: 98.4 (12-31-19 @ 22:28)  HR: 103 (01-01-20 @ 13:00)  BP: 123/74 (01-01-20 @ 13:00)  SpO2: 100% (01-01-20 @ 13:00)  Wt(kg): --    Output     12-31 @ 07:01  -  01-01 @ 07:00  --------------------------------------------------------  IN: 0 mL / OUT: 1200 mL / NET: -1200 mL    01-01 @ 07:01  -  01-01 @ 13:16  --------------------------------------------------------  IN: 225 mL / OUT: 200 mL / NET: 25 mL        Gen NAD  Abd Soft. NT      Labs      12-30 @ 22:30    WBC 10.50 / Hct 38.5  / SCr 0.54       Urine Cx: pending      Imaging

## 2020-01-01 NOTE — DISCHARGE NOTE PROVIDER - NSDCCPCAREPLAN_GEN_ALL_CORE_FT
PRINCIPAL DISCHARGE DIAGNOSIS  Diagnosis: Right ureteral calculus  Assessment and Plan of Treatment:

## 2020-01-01 NOTE — PROGRESS NOTE ADULT - ASSESSMENT
24 F with right 6 mm ureteral stone with negative urine culture  -NPO  -OR for Ureteroscopy  -Follow up cultures  -Post op check  -d/c home

## 2020-01-01 NOTE — DISCHARGE NOTE PROVIDER - HOSPITAL COURSE
23 yo female presented with subjective fevers and found to have obstructing ureteral calculus. Pt had negative urine culture and was taken to the OR for ureteroscopy stone extractio and right stent placement. Pt tolerated procedure without issues. Pt stable and discharged home after procedure.

## 2020-01-01 NOTE — DISCHARGE NOTE NURSING/CASE MANAGEMENT/SOCIAL WORK - PATIENT PORTAL LINK FT
You can access the FollowMyHealth Patient Portal offered by [facility] by registering at the following website: [email or web address]. By joining [employer]’s FollowMyHealth portal, you will also be able to view your health information using other applications (apps) compatible with our system.

## 2020-01-02 LAB — SPECIMEN SOURCE: SIGNIFICANT CHANGE UP

## 2020-01-03 LAB — BACTERIA UR CULT: SIGNIFICANT CHANGE UP

## 2020-01-04 LAB — STONE ANALYSIS-IMP: SIGNIFICANT CHANGE UP

## 2020-01-05 LAB
BACTERIA BLD CULT: SIGNIFICANT CHANGE UP
BACTERIA BLD CULT: SIGNIFICANT CHANGE UP

## 2020-01-14 ENCOUNTER — APPOINTMENT (OUTPATIENT)
Dept: UROLOGY | Facility: CLINIC | Age: 25
End: 2020-01-14
Payer: SELF-PAY

## 2020-01-14 ENCOUNTER — OUTPATIENT (OUTPATIENT)
Dept: OUTPATIENT SERVICES | Facility: HOSPITAL | Age: 25
LOS: 1 days | End: 2020-01-14
Payer: SELF-PAY

## 2020-01-14 VITALS
WEIGHT: 110.23 LBS | RESPIRATION RATE: 73 BRPM | SYSTOLIC BLOOD PRESSURE: 106 MMHG | HEART RATE: 16 BPM | HEIGHT: 64 IN | DIASTOLIC BLOOD PRESSURE: 71 MMHG | BODY MASS INDEX: 18.82 KG/M2

## 2020-01-14 DIAGNOSIS — N20.1 CALCULUS OF URETER: ICD-10-CM

## 2020-01-14 PROCEDURE — 52310 CYSTOSCOPY AND TREATMENT: CPT

## 2020-01-23 DIAGNOSIS — N20.1 CALCULUS OF URETER: ICD-10-CM

## 2020-01-23 PROBLEM — N20.0 CALCULUS OF KIDNEY: Chronic | Status: ACTIVE | Noted: 2019-12-31

## 2020-06-23 NOTE — PATIENT PROFILE ADULT - ARE SIGNIFICANT INDICATORS COMPLETE.
Patient here today for 2 week hearing aid check. Patient reports satisfaction with her devices and no major issues. Patient did get help from a family member to update her Apple ID and download the Sleep Number [email or web address] [email or web address].     Hearing Aid Summary  Hearing Aid Fit To  [employer]: Phonak Adaptive Digital  1st Fit: 06/09/2020  User Preference: 100%  Hearing Aid Mfr - Left: Phonak  Hearing Aid MODEL- Left: Audeo M50-R  Hearing Aid Mfr - Right: Phonak  Hearing Aid MODEL- Right: Audeo M50-R  Specifications  Serial Number: Left: [id number]  Serial Number: Right: [id number]  Dome Size - Left: Small vented  Dome Size - Right: Small vented  Retention Wire Left?: No  Retention Wire Right?: No  Wax System-Left: CeruStop  Wax System-Right: CeruStop  Speaker Size and Power - Left: 1xS  Speaker Size and Power - Right: 1xS  Battery Size: Rechargeable  Volume Control: Yes  Program Button: Disabled  Warranty Information  Mfr Repair Warranty Exp Date-Left: 08/11/22  Mfr Repair Warranty Exp Date-Right: 08/11/22  Loss and Damage Wrty Exp Date-Left: 08/11/22  Loss and Damage Wrty Exp Date-Right: 08/11/22  Trial Period End Date Left: 07/21/20  Trial Period End Date Right: 07/21/20  Audiology Warranty Exp Date: 12/09/20    Visual inspection revealed: both hearIng aids in overall good condition    Data logging revealed >18 hours of average daily use.     Programming changes made: Increased adaptation from 90% to 100%.    Successfully paired patient's hearing aids to her iPhone and demonstrated phone calls. It was noted that when patient answers using her phone rather than the push button, audio does not default to the hearing aids.     Attempted to instruct patient on wax guards use, but patient was unable to rotate the Luv Rink disk. Written instructions will be provided to her at her next appointment so she may get help with this from a family member.     Recommendations:  Continued use of hearing aids.   Annual hearing evaluation.   Speech map to be  obtained at end of trial appointment on 07/14/2020 .     Nate Barrera, CCC-A, FAAA             Yes